# Patient Record
Sex: FEMALE | NOT HISPANIC OR LATINO | Employment: UNEMPLOYED | URBAN - METROPOLITAN AREA
[De-identification: names, ages, dates, MRNs, and addresses within clinical notes are randomized per-mention and may not be internally consistent; named-entity substitution may affect disease eponyms.]

---
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
## 2019-05-14 ENCOUNTER — OFFICE VISIT (OUTPATIENT)
Dept: URGENT CARE | Age: 19
End: 2019-05-14
Payer: COMMERCIAL

## 2019-05-14 VITALS
SYSTOLIC BLOOD PRESSURE: 113 MMHG | RESPIRATION RATE: 16 BRPM | HEART RATE: 89 BPM | OXYGEN SATURATION: 98 % | BODY MASS INDEX: 27.56 KG/M2 | DIASTOLIC BLOOD PRESSURE: 66 MMHG | HEIGHT: 61 IN | WEIGHT: 146 LBS | TEMPERATURE: 98.5 F

## 2019-05-14 DIAGNOSIS — J30.9 ALLERGIC RHINITIS, UNSPECIFIED SEASONALITY, UNSPECIFIED TRIGGER: Primary | ICD-10-CM

## 2019-05-14 DIAGNOSIS — H69.81 EUSTACHIAN TUBE DYSFUNCTION, RIGHT: ICD-10-CM

## 2019-05-14 PROCEDURE — 99213 OFFICE O/P EST LOW 20 MIN: CPT | Performed by: PHYSICIAN ASSISTANT

## 2019-05-14 RX ORDER — FLUTICASONE PROPIONATE 50 MCG
1 SPRAY, SUSPENSION (ML) NASAL DAILY
Qty: 16 G | Refills: 0 | Status: SHIPPED | OUTPATIENT
Start: 2019-05-14

## 2019-05-14 RX ORDER — LORATADINE 10 MG/1
10 TABLET ORAL DAILY
Qty: 30 TABLET | Refills: 0 | Status: SHIPPED | OUTPATIENT
Start: 2019-05-14

## 2019-05-14 RX ORDER — AZITHROMYCIN 250 MG/1
TABLET, FILM COATED ORAL
Qty: 6 TABLET | Refills: 0 | Status: SHIPPED | OUTPATIENT
Start: 2019-05-14 | End: 2019-05-18

## 2019-05-14 RX ORDER — DROSPIRENONE AND ETHINYL ESTRADIOL 0.02-3(28)
1 KIT ORAL DAILY
COMMUNITY
Start: 2018-12-21

## 2020-08-03 ENCOUNTER — TRANSCRIBE ORDERS (OUTPATIENT)
Dept: PHYSICAL THERAPY | Facility: MEDICAL CENTER | Age: 20
End: 2020-08-03

## 2020-08-03 ENCOUNTER — EVALUATION (OUTPATIENT)
Dept: PHYSICAL THERAPY | Facility: MEDICAL CENTER | Age: 20
End: 2020-08-03
Payer: COMMERCIAL

## 2020-08-03 DIAGNOSIS — M25.511 RIGHT SHOULDER PAIN, UNSPECIFIED CHRONICITY: Primary | ICD-10-CM

## 2020-08-03 PROCEDURE — 97161 PT EVAL LOW COMPLEX 20 MIN: CPT | Performed by: PHYSICAL THERAPIST

## 2020-08-03 NOTE — LETTER
2020    Hoang Barrientos MD  [facility] [place] [address]    Patient: Rama Bloch   YOB: 2000   Date of Visit: 8/3/2020     Encounter Diagnosis     ICD-10-CM    1  Right shoulder pain, unspecified chronicity  M25 511        Dear Dr Silverio Choe:    Thank you for your recent referral of Rama Bloch  Please review the attached evaluation summary from Maribeth's recent visit  Please verify that you agree with the plan of care by signing the attached order  If you have any questions or concerns, please do not hesitate to call  I sincerely appreciate the opportunity to share in the care of one of your patients and hope to have another opportunity to work with you in the near future  Sincerely,    Sylvia Vazquez, PT      Referring Provider:      I certify that I have read the below Plan of Care and certify the need for these services furnished under this plan of treatment while under my care  Hoang Barrientos MD  [facility]: [phone]          PT Evaluation     Today's date: 8/3/2020  Patient name: Rama Bloch  : 2000  MRN: [id number]  Referring provider: Lester Mirza MD  Dx:   Encounter Diagnosis     ICD-10-CM    1  Right shoulder pain, unspecified chronicity  M25 511          Assessment  Assessment details: Pt is a pleasant 22 yo female presenting to physical therapy with R shoulder pain  Pt would benefit from skilled PT to address current impairments and return pt to pre-morbid function    Understanding of Dx/Px/POC: good   Prognosis: good    Plan  Patient would benefit from: skilled physical therapy  Other planned modality interventions: Modalities prn  Planned therapy interventions: manual therapy, neuromuscular re-education, patient education, therapeutic activities, therapeutic exercise, stretching and strengthening  Frequency: 2x week  Duration in weeks: 4  Treatment plan discussed with: patient        Subjective Evaluation    History of Present Illness  Mechanism of injury: Pt with a gradual onset of R shoulder pain, which started over a month ago  Pt states that she had a rehab program sent to her by her [employer]  Initially she felt better with rest and doing the rehab program, but her pain came back and is now worse  Aggravating factors: laying on her R side, vacuuming, swimming, activities with her arm away from body  Pain  Current pain ratin  At best pain ratin  At worst pain ratin  Quality: sharp and dull ache    Social Support    Workin [employer]  Exercise history: [employer] field       Diagnostic Tests  X-ray: normal  Treatments  Treatments tried: Tx program from [employer] at school  Patient Goals  Patient goals for therapy: decreased pain and return to sport/leisure activities          Objective     Postural Observations    Additional Postural Observation Details  + scapular protraction, anterior tilting and winging B    Cervical/Thoracic Screen   Cervical range of motion within normal limits  Thoracic range of motion within normal limits    Neurological Testing     Sensation     Shoulder   Left Shoulder   Intact: light touch    Right Shoulder   Intact: light touch    Active Range of Motion     Additional Active Range of Motion Details  Pt demonstrates full AROM B shoulder flexion and abduction, but demonstrates significant dyskinesis with these movements on the right      Passive Range of Motion   Left Shoulder   External rotation 90°: WFL  Internal rotation 90°: WFL    Right Shoulder   External rotation 90°: WFL  Internal rotation 90°: WFL    Strength/Myotome Testing     Left Shoulder     Planes of Motion   Flexion: 5   Abduction: 5   External rotation at 0°: 5   Internal rotation at 0°: 5     Right Shoulder     Planes of Motion   Flexion: 5   Abduction: 5   External rotation at 0°: 5   Internal rotation at 0°: 5 Additional Strength Details  Prone horizontal abduction with palm down: R 3+/5 L 5/5  Prone horizontal abduction with thumb up: R 3+/5 L 5/5  Prone flexion: R 3+/5 L 5/5    Tests     Left Shoulder   Negative scapular relocation  Right Shoulder   Positive scapular relocation         Flowsheet Rows      Most Recent Value   PT/OT G-Codes   Current Score  68   Projected Score  81             Precautions: None    Daily Treatment Diary       Exercise Diary  8/3            UBE NV            Scap 4 IP            UE alphabet NV            SL ER NV            Prone pro/ret NV

## 2020-08-03 NOTE — PROGRESS NOTES
PT Evaluation     Today's date: 8/3/2020  Patient name: Wallace Tyson  : 2000  MRN: [id number]  Referring provider: Arina Church MD  Dx:   Encounter Diagnosis     ICD-10-CM    1  Right shoulder pain, unspecified chronicity  M25 511          Assessment  Assessment details: Pt is a pleasant 24 yo female presenting to physical therapy with R shoulder pain  Pt would benefit from skilled PT to address current impairments and return pt to pre-morbid function  Understanding of Dx/Px/POC: good   Prognosis: good    Plan  Patient would benefit from: skilled physical therapy  Other planned modality interventions: Modalities prn  Planned therapy interventions: manual therapy, neuromuscular re-education, patient education, therapeutic activities, therapeutic exercise, stretching and strengthening  Frequency: 2x week  Duration in weeks: 4  Treatment plan discussed with: patient        Subjective Evaluation    History of Present Illness  Mechanism of injury: Pt with a gradual onset of R shoulder pain, which started over a month ago  Pt states that she had a rehab program sent to her by her [employer]  Initially she felt better with rest and doing the rehab program, but her pain came back and is now worse  Aggravating factors: laying on her R side, vacuuming, swimming, activities with her arm away from body  Pain  Current pain ratin  At best pain ratin  At worst pain ratin  Quality: sharp and dull ache    Social Support    Workin [address] [employer]  Exercise history: College field       Diagnostic Tests  X-ray: normal  Treatments  Treatments tried: Tx program from [employer] at school    Patient Goals  Patient goals for therapy: decreased pain and return to sport/leisure activities          Objective     Postural Observations    Additional Postural Observation Details  + scapular protraction, anterior tilting and winging B    Cervical/Thoracic Screen   Cervical range of motion within normal limits  Thoracic range of motion within normal limits    Neurological Testing     Sensation     Shoulder   Left Shoulder   Intact: light touch    Right Shoulder   Intact: light touch    Active Range of Motion     Additional Active Range of Motion Details  Pt demonstrates full AROM B shoulder flexion and abduction, but demonstrates significant dyskinesis with these movements on the right  Passive Range of Motion   Left Shoulder   External rotation 90°: WFL  Internal rotation 90°: WFL    Right Shoulder   External rotation 90°: WFL  Internal rotation 90°: WFL    Strength/Myotome Testing     Left Shoulder     Planes of Motion   Flexion: 5   Abduction: 5   External rotation at 0°: 5   Internal rotation at 0°: 5     Right Shoulder     Planes of Motion   Flexion: 5   Abduction: 5   External rotation at 0°: 5   Internal rotation at 0°: 5     Additional Strength Details  Prone horizontal abduction with palm down: R 3+/5 L 5/5  Prone horizontal abduction with thumb up: R 3+/5 L 5/5  Prone flexion: R 3+/5 L 5/5    Tests     Left Shoulder   Negative scapular relocation  Right Shoulder   Positive scapular relocation         Flowsheet Rows      Most Recent Value   PT/OT G-Codes   Current Score  68   Projected Score  81             Precautions: None    Daily Treatment Diary       Exercise Diary  8/3            UBE NV            Scap 4 IP            UE alphabet NV            SL ER NV            Prone pro/ret NV

## 2020-08-07 ENCOUNTER — OFFICE VISIT (OUTPATIENT)
Dept: PHYSICAL THERAPY | Facility: MEDICAL CENTER | Age: 20
End: 2020-08-07
Payer: COMMERCIAL

## 2020-08-07 DIAGNOSIS — M25.511 RIGHT SHOULDER PAIN, UNSPECIFIED CHRONICITY: Primary | ICD-10-CM

## 2020-08-07 PROCEDURE — 97110 THERAPEUTIC EXERCISES: CPT

## 2020-08-07 PROCEDURE — 97112 NEUROMUSCULAR REEDUCATION: CPT

## 2020-08-07 NOTE — PROGRESS NOTES
Daily Note     Today's date: 2020  Patient name: Constance Patel  : 2000  MRN: [id number]  Referring provider: Hernandez Bradford MD  Dx:   Encounter Diagnosis     ICD-10-CM    1  Right shoulder pain, unspecified chronicity  M25 511                   Subjective: Pt reports no changes since IE  Objective: See treatment diary below      Assessment: Tolerated treatment well  Patient demonstrated fatigue post treatment, exhibited good technique with therapeutic exercises and would benefit from continued PT  Initiated resistance w/scap 4 ex's  Pt demonstrates good technique with ex's after initial instruction  Issued Yellow Tb and updated hep this visit  Plan: Continue per plan of care        Precautions: None    Daily Treatment Diary       Exercise Diary  8/3 8/7           UBE NV 3F/3B           Scap 4 IP Yellow  3x10           UE alphabet NV 3x           SL ER NV 3x10           Prone pro/ret NV 3x10

## 2020-08-10 ENCOUNTER — OFFICE VISIT (OUTPATIENT)
Dept: PHYSICAL THERAPY | Facility: MEDICAL CENTER | Age: 20
End: 2020-08-10
Payer: COMMERCIAL

## 2020-08-10 DIAGNOSIS — M25.511 RIGHT SHOULDER PAIN, UNSPECIFIED CHRONICITY: Primary | ICD-10-CM

## 2020-08-10 PROCEDURE — 97110 THERAPEUTIC EXERCISES: CPT

## 2020-08-10 PROCEDURE — 97112 NEUROMUSCULAR REEDUCATION: CPT

## 2020-08-10 NOTE — PROGRESS NOTES
Daily Note     Today's date: 8/10/2020  Patient name: Juan R Obando  : 2000  MRN: [id number]  Referring provider: Malcolm Contreras MD  Dx:   Encounter Diagnosis     ICD-10-CM    1  Right shoulder pain, unspecified chronicity  M25 511                   Subjective: Pt reports that she experienced some mm soreness after LV  Objective: See treatment diary below      Assessment: Tolerated treatment well  Patient demonstrated fatigue post treatment, exhibited good technique with therapeutic exercises and would benefit from continued PT  Pt quite fatigued, but no pain with progressions made this visit  Plan: Continue per plan of care        Precautions: None    Daily Treatment Diary       Exercise Diary  8/3 8 810          UBE NV 3F/3B 3F/3B          Scap 4 IP Yellow  3x10 Red  2x10          UE alphabet NV 3x 3x          SL ER NV 3x10 3x15          Prone pro/ret NV 3x10 3x10          Prone raises   3x10

## 2020-08-13 ENCOUNTER — OFFICE VISIT (OUTPATIENT)
Dept: PHYSICAL THERAPY | Facility: MEDICAL CENTER | Age: 20
End: 2020-08-13
Payer: COMMERCIAL

## 2020-08-13 DIAGNOSIS — M25.511 RIGHT SHOULDER PAIN, UNSPECIFIED CHRONICITY: Primary | ICD-10-CM

## 2020-08-13 PROCEDURE — 97140 MANUAL THERAPY 1/> REGIONS: CPT | Performed by: PHYSICAL THERAPIST

## 2020-08-13 PROCEDURE — 97110 THERAPEUTIC EXERCISES: CPT | Performed by: PHYSICAL THERAPIST

## 2020-08-13 PROCEDURE — 97112 NEUROMUSCULAR REEDUCATION: CPT | Performed by: PHYSICAL THERAPIST

## 2020-08-17 ENCOUNTER — OFFICE VISIT (OUTPATIENT)
Dept: PHYSICAL THERAPY | Facility: MEDICAL CENTER | Age: 20
End: 2020-08-17
Payer: COMMERCIAL

## 2020-08-17 DIAGNOSIS — M25.511 RIGHT SHOULDER PAIN, UNSPECIFIED CHRONICITY: Primary | ICD-10-CM

## 2020-08-17 PROCEDURE — 97112 NEUROMUSCULAR REEDUCATION: CPT | Performed by: PHYSICAL THERAPIST

## 2020-08-17 PROCEDURE — 97110 THERAPEUTIC EXERCISES: CPT | Performed by: PHYSICAL THERAPIST

## 2020-08-17 NOTE — PROGRESS NOTES
Discharge Summary    Today's date: 2020  Patient name: Lauren Wesley  : 2000  MRN: [id number]  Referring provider: Linwood Meza MD  Dx:   Encounter Diagnosis     ICD-10-CM    1  Right shoulder pain, unspecified chronicity  M25 511             Subjective:  Pt reports that her shoulder is doing well  She is not having any regular discomfort  Objective: See treatment diary below    Pt with full AROM min ST dyskinesis to note R shoulder  MMT:  5/ R shoulder all planes    Assessment: Lauren Wesley has been compliant with attending PT and home exercise program since initial eval   Awafranki Dowdxiao  has made improvements in objective data since initial evalulation and has achieved all goals  Patient reports having returned to their prior level or function  It was mutually agreed to Discharge to home exercise program at this time        Plan: D/C PT     Precautions: None    Daily Treatment Diary       Exercise Diary  8/3 8/7 8/10 8/13 8/17        UBE NV 3F/3B 3F/3B 3F/3B 3F/3B        Scap 4 IP Yellow  3x10 Red  2x10 Green  3x10 Green  3X15        UE alphabet NV 3x 3x 1#  3X 2#  3X        SL ER NV 3x10 3x15 1#  3x15   2#  3x15        Prone pro/ret NV 3x10 3x10 x30 x30        Prone raises X3   3x10 3x10 3x10        Wall slides     3x15                                                                                                                             Manuals                          MFR R teres minor and infra    HK NP

## 2024-03-19 ENCOUNTER — TELEPHONE (OUTPATIENT)
Dept: ADMINISTRATIVE | Facility: OTHER | Age: 24
End: 2024-03-19

## 2024-03-19 NOTE — TELEPHONE ENCOUNTER
----- Message from Bjorn Marinelli sent at 3/18/2024  3:23 PM EDT -----  Regarding: Pap test  03/18/24 3:23 PM    Hello, our patient attached above has had Pap Smear (HPV) aka Cervical Cancer Screening completed/performed. Please assist in updating the patient chart by pulling the Care Everywhere (CE) document. The date of service is 1/24/2024.     Thank you,  BJORN MARINELLI  [facility]

## 2024-03-19 NOTE — TELEPHONE ENCOUNTER
Upon review of the In Basket request we were able to locate, review, and update the patient chart as requested for Pap Smear (HPV) aka Cervical Cancer Screening.    Any additional questions or concerns should be emailed to the Practice Liaisons via the appropriate education email address, please do not reply via In Basket.    Thank you  Anna Crowell

## 2024-03-20 ENCOUNTER — OFFICE VISIT (OUTPATIENT)
Dept: FAMILY MEDICINE CLINIC | Facility: CLINIC | Age: 24
End: 2024-03-20
Payer: COMMERCIAL

## 2024-03-20 VITALS
WEIGHT: 154 LBS | DIASTOLIC BLOOD PRESSURE: 68 MMHG | OXYGEN SATURATION: 99 % | BODY MASS INDEX: 29.07 KG/M2 | TEMPERATURE: 97.3 F | HEART RATE: 86 BPM | SYSTOLIC BLOOD PRESSURE: 110 MMHG | HEIGHT: 61 IN

## 2024-03-20 DIAGNOSIS — Z13.220 SCREENING FOR HYPERLIPIDEMIA: ICD-10-CM

## 2024-03-20 DIAGNOSIS — E66.3 OVERWEIGHT (BMI 25.0-29.9): ICD-10-CM

## 2024-03-20 DIAGNOSIS — Z00.00 HEALTH CARE MAINTENANCE: Primary | ICD-10-CM

## 2024-03-20 PROCEDURE — 99395 PREV VISIT EST AGE 18-39: CPT | Performed by: FAMILY MEDICINE

## 2024-03-20 NOTE — PATIENT INSTRUCTIONS
Or general PE and see GYN for cervical cancer screening. No colon cancer family hx or breast cancer family history. Get labs and eat healthy and exercise and call if any problems Use sunscreen and monitor for ticks.

## 2024-03-20 NOTE — PROGRESS NOTES
Chief Complaint   Patient presents with    Annual Exam     Patient Instructions   Or general PE and see GYN for cervical cancer screening. No colon cancer family hx or breast cancer family history. Get labs and eat healthy and exercise and call if any problems Use sunscreen and monitor for ticks.    Assessment/Plan:    No problem-specific Assessment & Plan notes found for this encounter.       Diagnoses and all orders for this visit:    Health care maintenance  -     Comprehensive metabolic panel; Future  -     CBC and differential; Future  -     Lipid Panel with Direct LDL reflex; Future  -     TSH, 3rd generation; Future  -     T4, free; Future  -     Vitamin D 25 hydroxy; Future    Overweight (BMI 25.0-29.9)  -     Comprehensive metabolic panel; Future  -     CBC and differential; Future  -     Lipid Panel with Direct LDL reflex; Future  -     TSH, 3rd generation; Future  -     T4, free; Future  -     Vitamin D 25 hydroxy; Future    Screening for hyperlipidemia  -     Comprehensive metabolic panel; Future  -     CBC and differential; Future  -     Lipid Panel with Direct LDL reflex; Future  -     TSH, 3rd generation; Future  -     T4, free; Future  -     Vitamin D 25 hydroxy; Future          Subjective:      Patient ID: Maribeth Pitts is a 24 y.o. female.    Annual Exam. Patient is single and lives at home and is a . 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 Sees GYN yearly at [facility]. Periods regular and on BCP. Patient does HIIT work outs and peloton. Tries to eat healthy and gets 8 hours of sleep. Stress manageable. No problems with anxiety or depression. Uses sunscreen and monitors for ticks.         The following portions of the patient's history were reviewed and updated as appropriate: allergies, current medications, past family history, past medical history, past social  "history, past surgical history, and problem list.    Review of Systems   Constitutional: Negative.    HENT: Negative.     Eyes: Negative.    Respiratory: Negative.     Cardiovascular: Negative.    Gastrointestinal: Negative.    Endocrine: Negative.    Genitourinary: Negative.    Musculoskeletal: Negative.    Skin: Negative.    Allergic/Immunologic: Negative.    Neurological: Negative.    Hematological: Negative.    Psychiatric/Behavioral: Negative.           Objective:      /68   Pulse 86   Temp (!) 97.3 °F (36.3 °C)   Ht 5' 1\" (1.549 m)   Wt 69.9 kg (154 lb)   SpO2 99%   BMI 29.10 kg/m²          Physical Exam  Constitutional:       Appearance: She is well-developed.      Comments: overweight   HENT:      Head: Normocephalic and atraumatic.      Right Ear: Tympanic membrane, ear canal and external ear normal.      Left Ear: Tympanic membrane, ear canal and external ear normal.      Nose: Nose normal.      Mouth/Throat:      Mouth: Mucous membranes are moist.   Eyes:      Conjunctiva/sclera: Conjunctivae normal.      Pupils: Pupils are equal, round, and reactive to light.   Cardiovascular:      Rate and Rhythm: Normal rate and regular rhythm.      Pulses: Normal pulses.      Heart sounds: Normal heart sounds.   Pulmonary:      Effort: Pulmonary effort is normal.      Breath sounds: Normal breath sounds.   Abdominal:      General: Abdomen is flat. Bowel sounds are normal.      Palpations: Abdomen is soft.   Musculoskeletal:         General: Normal range of motion.      Cervical back: Normal range of motion and neck supple.   Skin:     General: Skin is warm and dry.      Capillary Refill: Capillary refill takes less than 2 seconds.   Neurological:      General: No focal deficit present.      Mental Status: She is alert and oriented to person, place, and time. Mental status is at baseline.      Deep Tendon Reflexes: Reflexes are normal and symmetric.   Psychiatric:         Mood and Affect: Mood normal.       "   Behavior: Behavior normal.         Thought Content: Thought content normal.         Judgment: Judgment normal.

## 2024-03-27 NOTE — PROGRESS NOTES
Daily Note     Today's date: 2020  Patient name: Arpita Patrick  : 2000  MRN: [id number]  Referring provider: Beulah Hoffmann MD  Dx:   Encounter Diagnosis     ICD-10-CM    1  Right shoulder pain, unspecified chronicity  M25 511             Subjective:  Pt reports that her shoulder feel almost normal       Objective: See treatment diary below    Pt complained of some posterior shoulder discomfort with wall slides  Further inspection revealed tightness in her infraspinatus and teres minor  Assessment: Tolerated treatment well  Patient demonstrated fatigue post treatment, exhibited good technique with therapeutic exercises and would benefit from continued PT  Pt would benefit from self STM to improve infra and teres tightness  Plan: Continue per plan of care        Precautions: None    Daily Treatment Diary       Exercise Diary  8/3 8/7 8/10 8/13         UBE NV 3F/3B 3F/3B 3F/3B         Scap 4 IP Yellow  3x10 Red  2x10 Green  3x10         UE alphabet NV 3x 3x 1#  3X         SL ER NV 3x10 3x15 1#  3x15           Prone pro/ret NV 3x10 3x10 x30         Prone raises X3   3x10 3x10                                                                                                                                           Manuals                          MFR R teres minor and infra    HK
self-care/home management/community/leisure

## 2025-03-24 ENCOUNTER — OFFICE VISIT (OUTPATIENT)
Dept: FAMILY MEDICINE CLINIC | Facility: CLINIC | Age: 25
End: 2025-03-24
Payer: COMMERCIAL

## 2025-03-24 VITALS
DIASTOLIC BLOOD PRESSURE: 82 MMHG | HEIGHT: 61 IN | OXYGEN SATURATION: 97 % | WEIGHT: 155 LBS | HEART RATE: 84 BPM | RESPIRATION RATE: 16 BRPM | SYSTOLIC BLOOD PRESSURE: 122 MMHG | TEMPERATURE: 97.3 F | BODY MASS INDEX: 29.27 KG/M2

## 2025-03-24 DIAGNOSIS — E66.3 OVERWEIGHT (BMI 25.0-29.9): ICD-10-CM

## 2025-03-24 DIAGNOSIS — Z13.220 SCREENING FOR HYPERLIPIDEMIA: ICD-10-CM

## 2025-03-24 DIAGNOSIS — Z00.00 HEALTH CARE MAINTENANCE: Primary | ICD-10-CM

## 2025-03-24 PROCEDURE — 99395 PREV VISIT EST AGE 18-39: CPT | Performed by: FAMILY MEDICINE

## 2025-03-24 NOTE — PROGRESS NOTES
Adult Annual Physical  Name: Maribeth Pitts      : 2000      MRN: [id number]  Encounter Provider: Fatoumata Ham DO  Encounter Date: 3/24/2025   Encounter department: [facility] PRIMARY [facility]ARE  Chief Complaint   Patient presents with    Well Check     Patient Instructions   Here for general PE and is playing pickleball and reads and does travel and does eat healthy and does exercise. Check labs as directed. Use sunscreen and monitor for ticks. Use sunscreen and monitor for ticks.     Assessment & Plan  Health care maintenance  Check labs  Orders:    Comprehensive metabolic panel; Future    CBC and differential; Future    Lipid Panel with Direct LDL reflex; Future    TSH, 3rd generation with Free T4 reflex; Future    Screening for hyperlipidemia  Check labs  Orders:    Comprehensive metabolic panel; Future    CBC and differential; Future    Lipid Panel with Direct LDL reflex; Future    TSH, 3rd generation with Free T4 reflex; Future    Overweight (BMI 25.0-29.9)  Lose weight as directed to get BMI lower than 25 via diet and exercise    Orders:    Comprehensive metabolic panel; Future    CBC and differential; Future    Lipid Panel with Direct LDL reflex; Future    TSH, 3rd generation with Free T4 reflex; Future    Preventive Screenings:    - Cervical cancer screening: screening up-to-date     Immunizations:  - Immunizations due: Influenza, Tdap and HPV (Gardasil 9)      Depression Screening and Follow-up Plan: Patient was screened for depression during today's encounter. They screened negative with a PHQ-2 score of 0.          History of Present Illness     Adult Annual Physical:  Patient presents for annual physical. Patient is single and is a . Is single and lives in an apartment. Patient does exercise and goes to gym. Strength training. Patient is eating healthy and avoiding processed foods. Non smoker and does drink alcohol rarely. Patient sleeps 8 hours per night and average  is 7. .     Diet and Physical Activity:  - Diet/Nutrition: no special diet, consuming 3-5 servings of fruits/vegetables daily, adequate fiber intake and adequate whole grain intake.  - Exercise: walking, moderate cardiovascular exercise, vigorous cardiovascular exercise, strength training exercises, 3-4 times a week on average and 1-2 hours on average.    Depression Screening:  - PHQ-2 Score: 0    General Health:  - Sleep: sleeps well and 7-8 hours of sleep on average.  - Hearing: normal hearing bilateral ears.  - Vision: wears glasses and most recent eye exam < 1 year ago.  - Dental: regular dental visits, brushes teeth twice daily and floss regularly.    /GYN Health:  - Follows with GYN: yes.   - Menopause: premenopausal.   - Last menstrual cycle: 3/3/2025.   - History of STDs: no  - Contraception: barrier methods and oral contraceptives.      Advanced Care Planning:  - Has an advanced directive?: no    - Has a durable medical POA?: no      Review of Systems   Constitutional: Negative.    HENT: Negative.     Eyes: Negative.    Respiratory: Negative.     Cardiovascular: Negative.    Gastrointestinal: Negative.    Endocrine: Negative.    Genitourinary: Negative.    Musculoskeletal: Negative.    Skin: Negative.    Allergic/Immunologic: Negative.    Neurological: Negative.    Hematological: Negative.    Psychiatric/Behavioral: Negative.       Current Outpatient Medications on File Prior to Visit   Medication Sig Dispense Refill    drospirenone-ethinyl estradiol (GIANVI) 3-0.02 MG per tablet Take 1 tablet by mouth daily      [DISCONTINUED] fluticasone (FLONASE) 50 mcg/act nasal spray 1 spray into each nostril daily (Patient not taking: Reported on 3/20/2024) 16 g 0    [DISCONTINUED] loratadine (CLARITIN) 10 mg tablet Take 1 tablet (10 mg total) by mouth daily (Patient not taking: Reported on 3/20/2024) 30 tablet 0     No current facility-administered medications on file prior to visit.        Objective   /82    "Pulse 84   Temp (!) 97.3 °F (36.3 °C) (Temporal)   Resp 16   Ht 5' 1\" (1.549 m)   Wt 70.3 kg (155 lb)   LMP 03/03/2025   SpO2 97%   BMI 29.29 kg/m²     Physical Exam  Constitutional:       Appearance: She is well-developed.      Comments: Overweight.    HENT:      Head: Normocephalic and atraumatic.      Right Ear: External ear normal.      Left Ear: External ear normal.      Nose: Nose normal.      Mouth/Throat:      Mouth: Mucous membranes are moist.   Eyes:      Conjunctiva/sclera: Conjunctivae normal.      Pupils: Pupils are equal, round, and reactive to light.   Cardiovascular:      Rate and Rhythm: Normal rate and regular rhythm.      Pulses: Normal pulses.      Heart sounds: Normal heart sounds.   Pulmonary:      Effort: Pulmonary effort is normal.      Breath sounds: Normal breath sounds.   Abdominal:      General: Abdomen is flat. Bowel sounds are normal.      Palpations: Abdomen is soft.   Musculoskeletal:         General: Normal range of motion.      Cervical back: Normal range of motion and neck supple.   Skin:     General: Skin is warm and dry.      Capillary Refill: Capillary refill takes less than 2 seconds.   Neurological:      General: No focal deficit present.      Mental Status: She is alert and oriented to person, place, and time. Mental status is at baseline.      Deep Tendon Reflexes: Reflexes are normal and symmetric.   Psychiatric:         Mood and Affect: Mood normal.         Behavior: Behavior normal.         Thought Content: Thought content normal.         Judgment: Judgment normal.       Administrative Statements   I have spent a total time of 35 minutes in caring for this patient on the day of the visit/encounter including Diagnostic results, Prognosis, Risks and benefits of tx options, Instructions for management, Patient and family education, Importance of tx compliance, Risk factor reductions, Impressions, Counseling / Coordination of care, Documenting in the medical record, " Reviewing/placing orders in the medical record (including tests, medications, and/or procedures), and Obtaining or reviewing history  .

## 2025-03-24 NOTE — PATIENT INSTRUCTIONS
Here for general PE and is playing pickleball and reads and does travel and does eat healthy and does exercise. Check labs as directed. Use sunscreen and monitor for ticks. Use sunscreen and monitor for ticks.

## 2025-03-29 LAB
ALBUMIN SERPL-MCNC: 3.7 G/DL (ref 3.5–5.7)
ALP SERPL-CCNC: 36 U/L (ref 35–120)
ALT SERPL-CCNC: 14 U/L
ANION GAP SERPL CALCULATED.3IONS-SCNC: 7 MMOL/L (ref 3–11)
AST SERPL-CCNC: 15 U/L
BASOPHILS # BLD AUTO: 0.1 THOU/CMM (ref 0–0.1)
BASOPHILS NFR BLD AUTO: 1 %
BILIRUB SERPL-MCNC: 0.7 MG/DL (ref 0.2–1)
BUN SERPL-MCNC: 12 MG/DL (ref 7–25)
CALCIUM SERPL-MCNC: 9.1 MG/DL (ref 8.5–10.5)
CHLORIDE SERPL-SCNC: 104 MMOL/L (ref 100–109)
CHOLEST SERPL-MCNC: 159 MG/DL
CHOLEST/HDLC SERPL: 2.6 {RATIO}
CO2 SERPL-SCNC: 27 MMOL/L (ref 21–31)
CREAT SERPL-MCNC: 0.87 MG/DL (ref 0.4–1.1)
CYTOLOGY CMNT CVX/VAG CYTO-IMP: NORMAL
DIFFERENTIAL METHOD BLD: NORMAL
EOSINOPHIL # BLD AUTO: 0.1 THOU/CMM (ref 0–0.5)
EOSINOPHIL NFR BLD AUTO: 1 %
ERYTHROCYTE [DISTWIDTH] IN BLOOD BY AUTOMATED COUNT: 12.9 % (ref 12–16)
GFR/BSA.PRED SERPLBLD CYS-BASED-ARV: 94 ML/MIN/{1.73_M2}
GLUCOSE SERPL-MCNC: 92 MG/DL (ref 65–99)
HCT VFR BLD AUTO: 38.1 % (ref 35–43)
HDLC SERPL-MCNC: 62 MG/DL (ref 23–92)
HGB BLD-MCNC: 13.1 G/DL (ref 11.5–14.5)
LDLC SERPL CALC-MCNC: 86 MG/DL
LYMPHOCYTES # BLD AUTO: 2.4 THOU/CMM (ref 1–3)
LYMPHOCYTES NFR BLD AUTO: 39 %
MCH RBC QN AUTO: 28.8 PG (ref 26–34)
MCHC RBC AUTO-ENTMCNC: 34.4 G/DL (ref 32–37)
MCV RBC AUTO: 84 FL (ref 80–100)
MONOCYTES # BLD AUTO: 0.5 THOU/CMM (ref 0.3–1)
MONOCYTES NFR BLD AUTO: 9 %
NEUTROPHILS # BLD AUTO: 3.1 THOU/CMM (ref 1.8–7.8)
NEUTROPHILS NFR BLD AUTO: 50 %
NONHDLC SERPL-MCNC: 97 MG/DL
PLATELET # BLD AUTO: 248 THOU/CMM (ref 140–350)
PMV BLD REES-ECKER: 10.1 FL (ref 7.5–11.3)
POTASSIUM SERPL-SCNC: 4.3 MMOL/L (ref 3.5–5.2)
PROT SERPL-MCNC: 6.5 G/DL (ref 6.3–8.3)
RBC # BLD AUTO: 4.56 MILL/CMM (ref 3.7–4.7)
SODIUM SERPL-SCNC: 138 MMOL/L (ref 135–145)
TRIGL SERPL-MCNC: 53 MG/DL
TSH SERPL-ACNC: 2.11 UIU/ML (ref 0.45–5.33)
WBC # BLD AUTO: 6.2 THOU/CMM (ref 4–10)

## 2025-03-30 ENCOUNTER — RESULTS FOLLOW-UP (OUTPATIENT)
Dept: FAMILY MEDICINE CLINIC | Facility: CLINIC | Age: 25
End: 2025-03-30